# Patient Record
Sex: FEMALE | Race: WHITE | NOT HISPANIC OR LATINO | URBAN - METROPOLITAN AREA
[De-identification: names, ages, dates, MRNs, and addresses within clinical notes are randomized per-mention and may not be internally consistent; named-entity substitution may affect disease eponyms.]

---

## 2021-03-29 ENCOUNTER — EMERGENCY (EMERGENCY)
Facility: HOSPITAL | Age: 31
LOS: 1 days | Discharge: HOME | End: 2021-03-29
Attending: EMERGENCY MEDICINE | Admitting: EMERGENCY MEDICINE
Payer: SELF-PAY

## 2021-03-29 VITALS
WEIGHT: 125 LBS | HEIGHT: 69 IN | HEART RATE: 96 BPM | RESPIRATION RATE: 16 BRPM | TEMPERATURE: 98 F | DIASTOLIC BLOOD PRESSURE: 58 MMHG | SYSTOLIC BLOOD PRESSURE: 107 MMHG | OXYGEN SATURATION: 97 %

## 2021-03-29 DIAGNOSIS — H53.8 OTHER VISUAL DISTURBANCES: ICD-10-CM

## 2021-03-29 PROCEDURE — 99282 EMERGENCY DEPT VISIT SF MDM: CPT

## 2021-03-29 NOTE — ED ADULT NURSE NOTE - OBJECTIVE STATEMENT
Pt c/o vision changes. Pt states she feels left eye is weaker and cross eyed. Pt denies numbness, tingling, weakness, ambulates with steady gait.

## 2021-03-29 NOTE — ED PROVIDER NOTE - CLINICAL SUMMARY MEDICAL DECISION MAKING FREE TEXT BOX
reports intermittent blurry vision and lazy eye. vision 20/20 in ed. external eye exam wnl. will refer to ophtho for further eval.

## 2021-03-29 NOTE — ED PROVIDER NOTE - OBJECTIVE STATEMENT
here with reported blurry vision "for a while now" intermittently. States past few months notes sometimes difficulty focusing on objects, more in distance. Also notes she can cross eyes, move left independently of right which she wasn't able to do before. No pain, headache, dizziness. Reports she tried to go to [facility] a few months ago for exam "but they threw me out almost immediately." When asked why, she reports "I don't know, there was some entitled woman there."  Concerned she may have a lazy eye

## 2021-03-29 NOTE — ED PROVIDER NOTE - NSFOLLOWUPCLINICS_GEN_ALL_ED_FT
[facility]  Ophthalmology  [address] [place] [address]  Phone: [phone]  Fax:   Follow Up Time:

## 2021-03-29 NOTE — ED PROVIDER NOTE - PATIENT PORTAL LINK FT
You can access the FollowMyHealth Patient Portal offered by [facility] by registering at the following website: [email or web address]. By joining [facility]’s FollowMyHealth portal, you will also be able to view your health information using other applications (apps) compatible with our system.

## 2021-03-29 NOTE — ED PROVIDER NOTE - NSFOLLOWUPINSTRUCTIONS_ED_ALL_ED_FT
Please see referral to eye doctor for further evaluation.  Call for appointment.  If you have any problems with followup, please call the ED Referral Coordinator at [phone].  Return to the ER if symptoms worsen or other concerns.      Blurred Vision, Adult    Having blurred vision means that you cannot see things clearly. Your vision may seem fuzzy or out of focus. It can involve your vision for objects that are close or far away. It may affect one or both eyes. There are many causes of blurred vision, including cataracts, macular degeneration, eye inflammation (uveitis), and diabetic retinopathy.  In many cases, blurred vision has to do with the shape of your eye. An abnormal eye shape means you cannot focus well (refractive error). When this happens, it can cause:  •Faraway objects to look blurry (nearsightedness).      •Close objects to look blurry (farsightedness).      •Blurry vision at any distance (astigmatism).      Refractive errors are often corrected with glasses or contacts.    Blurred vision can be diagnosed based on your symptoms and a physical exam. Tell your health care provider about any other health problems you have, any recent eye injury, and any prior surgeries. You may need to see a health care provider who specializes in eye problems (ophthalmologist). Your treatment will depend on what is causing your blurred vision.      Follow these instructions at home:    •Keep all follow-up visits as told by your health care provider. This is important. These include any visits to your eye specialists.      • Do not drive or use heavy machinery if your vision is blurry.      •Use eye drops only as told by your health care provider.      •If you were prescribed glasses or contact lenses, wear the glasses or contacts as told by your health care provider.      •Schedule eye exams regularly.      •Pay attention to any changes in your symptoms.        Contact a health care provider if:    •Your symptoms do not improve or they get worse.    •You have:  •New symptoms.      •A headache.      •Trouble seeing at night.      •Trouble noticing the difference between colors.      •You notice:  •Drooping of your eyelids.      •Drainage coming from your eyes.      •A rash around your eyes.          Get help right away if:  •You have:  •Severe eye pain.      •A severe headache.      •A sudden change in vision.      •A sudden loss of vision.      •A vision change after an injury.        •You notice flashing lights in your field of vision. Your field of vision is the area that you can see without moving your eyes.        Summary    •Having blurred vision means that you cannot see things clearly. Your vision may seem fuzzy or out of focus.      •There are many causes of blurred vision. In many cases, blurred vision has to do with an abnormal eye shape (refractive error), and it can be corrected with glasses or contact lenses.      •Pay attention to any changes in your symptoms. Contact a health care provider if your symptoms do not improve or if you have any new symptoms.      This information is not intended to replace advice given to you by your health care provider. Make sure you discuss any questions you have with your health care provider.

## 2021-03-29 NOTE — ED ADULT TRIAGE NOTE - CHIEF COMPLAINT QUOTE
Pt reports blurred vision starting yesterday, "distances look more blurry" and eyes "having a pulling sensation." Pt also reports "I can cross one eye and keep the other straight and I couldn't do that before." Pt denies eye pain, redness. Pt denies n/v, dizziness, weakness, changes in vision. Pt speaking in full sentences, ambulating with a steady gait.

## 2021-03-29 NOTE — ED PROVIDER NOTE - EYES, MLM
Clear bilaterally, pupils equal, round and reactive to light.  Eomi. Lids normal. no nystagmus. conjunctiva not injected, no discharge. non dilated fundoscopic wnl. vision 20/20 bilaterally

## 2021-04-27 ENCOUNTER — EMERGENCY (EMERGENCY)
Facility: HOSPITAL | Age: 31
LOS: 1 days | Discharge: HOME | End: 2021-04-27
Attending: EMERGENCY MEDICINE | Admitting: EMERGENCY MEDICINE
Payer: SELF-PAY

## 2021-04-27 VITALS
OXYGEN SATURATION: 97 % | TEMPERATURE: 98 F | HEIGHT: 69 IN | RESPIRATION RATE: 18 BRPM | DIASTOLIC BLOOD PRESSURE: 72 MMHG | HEART RATE: 87 BPM | SYSTOLIC BLOOD PRESSURE: 115 MMHG

## 2021-04-27 DIAGNOSIS — R20.2 PARESTHESIA OF SKIN: ICD-10-CM

## 2021-04-27 DIAGNOSIS — K02.9 DENTAL CARIES, UNSPECIFIED: ICD-10-CM

## 2021-04-27 PROBLEM — Z78.9 OTHER SPECIFIED HEALTH STATUS: Chronic | Status: ACTIVE | Noted: 2021-03-29

## 2021-04-27 PROCEDURE — 99053 MED SERV 10PM-8AM 24 HR FAC: CPT

## 2021-04-27 PROCEDURE — 99284 EMERGENCY DEPT VISIT MOD MDM: CPT

## 2021-04-27 PROCEDURE — 99283 EMERGENCY DEPT VISIT LOW MDM: CPT

## 2021-04-27 RX ORDER — KETOROLAC TROMETHAMINE 30 MG/ML
15 SYRINGE (ML) INJECTION ONCE
Refills: 0 | Status: COMPLETED | OUTPATIENT
Start: 2021-04-27 | End: 2021-04-27

## 2021-04-27 RX ADMIN — Medication 1 TABLET(S): at 00:56

## 2021-04-27 NOTE — ED PROVIDER NOTE - CLINICAL SUMMARY MEDICAL DECISION MAKING FREE TEXT BOX
avss. nontoxic. NAD. no systemic sx. no acute focal neuro deficits. found to have tooth #14 missing crown w/ diffuse dental caries. no dental pain. no red flags. no risk factors. no e/o ludwigs angina vs cellulitis vs gross periodontal infection. s/p augmentin/toradol. pt w/ full capacity. pt offered but declined labs at this time (see progress note). will dc w/ outpatient dental fu, augmentin, strict return precautions. pt agrees w/ plan. questions answered.

## 2021-04-27 NOTE — ED ADULT NURSE NOTE - TEMPLATE
Patient informed of Dr Lassiter's recommendations.  Patient verbalizes understanding and denies questions at this time.    General

## 2021-04-27 NOTE — ED PROVIDER NOTE - OBJECTIVE STATEMENT
30F former smoker (2py), no medical problems/no Rx, c/o displaced crown for ?duration and now <24h tingling to L cheek/scalp vertex. pt has not yet seen dentist. no fever/chills, no uri/cough, no difficulty eating/swallowing, no drooling/trismus/phonation changes, no rash/swelling, no dental pain, no neck pain/stiffness/swelling, no trauma, no etoh-dpt/ivdu/dm/immunosuppression.

## 2021-04-27 NOTE — ED PROVIDER NOTE - PHYSICAL EXAMINATION
CONST: nontoxic NAD speaking in full sentences  HEAD: atraumatic  EYES: conjunctivae clear, PERRL, EOMI  ENT: +tooth #14 missing crown w/ diffuse caries, nontender to percussion, gums pink w/o erythema/fluctuance, no cheek/facial swelling/rash/ttp, no trismus/phonation changes/drooling, oropharynx clear, tongue intact  NECK: supple/FROM, nttp, no masses  CARD: rrr no murmurs  CHEST: ctab no r/r/w  EXT: FROM, symmetric distal pulses intact  SKIN: warm, dry, no rash, no pedal edema/ttp/rash, cap refill <2sec  NEURO: a+ox3, CN II-XII intact, 5/5 strength x4, gross sensation intact x4, normal gait

## 2021-04-27 NOTE — ED ADULT TRIAGE NOTE - ARRIVAL INFO ADDITIONAL COMMENTS
pt states several months ago she broke a crown on her mouth, 3 days ago she developed numbness in her face and tonight developed tinging in the top of her head.   speech clear.  no focal deficits.

## 2021-04-27 NOTE — ED PROVIDER NOTE - PATIENT PORTAL LINK FT
You can access the FollowMyHealth Patient Portal offered by [facility] by registering at the following website: [email or web address]. By joining [facility]’s FollowMyHealth portal, you will also be able to view your health information using other applications (apps) compatible with our system.

## 2021-04-27 NOTE — ED PROVIDER NOTE - NSFOLLOWUPINSTRUCTIONS_ED_ALL_ED_FT
PLEASE FOLLOW-UP WITH DENTAL AS SOON AS POSSIBLE, PREFERABLY LATER TODAY 4/27/21.    PLEASE CONTINUE ANTIBIOTICS AS PRESCRIBED.     PLEASE RETURN TO THE EMERGENCY DEPARTMENT IF SEVERE HEADACHE, DIZZINESS, NECK PAIN/STIFFNESS, PAINFUL/DIFFICULTY SWALLOWING, HOARSENESS, DROOLING, DIFFICULTY OPENING MOUTH, FACIAL RASH/SWELLING, FEVER, CHEST PAIN, SHORTNESS OF BREATH, ABDOMINAL PAIN, VOMITING, OTHER CONCERNING SYMPTOMS. PLEASE FOLLOW-UP WITH DENTAL AS SOON AS POSSIBLE, PREFERABLY LATER TODAY 4/27/21. [facility] OR [facility] HAVE WALK-IN DENTAL SERVICES.    PLEASE CONTINUE ANTIBIOTICS AS PRESCRIBED.     PLEASE RETURN TO THE EMERGENCY DEPARTMENT IF SEVERE HEADACHE, DIZZINESS, NECK PAIN/STIFFNESS, PAINFUL/DIFFICULTY SWALLOWING, HOARSENESS, DROOLING, DIFFICULTY OPENING MOUTH, FACIAL RASH/SWELLING, FEVER, CHEST PAIN, SHORTNESS OF BREATH, ABDOMINAL PAIN, VOMITING, OTHER CONCERNING SYMPTOMS. PLEASE FOLLOW-UP WITH DENTAL AS SOON AS POSSIBLE, PREFERABLY LATER TODAY 4/27/21. [facility] HAVE WALK-IN DENTAL [place] [address]  [phone]    PLEASE CONTINUE ANTIBIOTICS AS PRESCRIBED.     PLEASE RETURN TO THE EMERGENCY DEPARTMENT IF SEVERE HEADACHE, DIZZINESS, NECK PAIN/STIFFNESS, PAINFUL/DIFFICULTY SWALLOWING, HOARSENESS, DROOLING, DIFFICULTY OPENING MOUTH, FACIAL RASH/SWELLING, FEVER, CHEST PAIN, SHORTNESS OF BREATH, ABDOMINAL PAIN, VOMITING, OTHER CONCERNING SYMPTOMS.

## 2021-04-27 NOTE — ED PROVIDER NOTE - PROGRESS NOTE DETAILS
pt w/ full capacity. pt declining labs at this time. pt understands risk of missed/worsening disease, including mortality. questions answered. pt understands she may return at a later time to attain labs.

## 2021-10-05 NOTE — ED ADULT NURSE NOTE - NSHOSCREENINGQ1_ED_ALL_ED
Chronic, due to fatty liver, low-carb diet, weight loss discussed and advised, will continue to monitor with labs   No